# Patient Record
Sex: MALE | Race: WHITE | Employment: FULL TIME | URBAN - NONMETROPOLITAN AREA
[De-identification: names, ages, dates, MRNs, and addresses within clinical notes are randomized per-mention and may not be internally consistent; named-entity substitution may affect disease eponyms.]

---

## 2019-04-01 ENCOUNTER — OFFICE VISIT (OUTPATIENT)
Dept: FAMILY MEDICINE CLINIC | Age: 34
End: 2019-04-01
Payer: COMMERCIAL

## 2019-04-01 VITALS
SYSTOLIC BLOOD PRESSURE: 138 MMHG | RESPIRATION RATE: 16 BRPM | DIASTOLIC BLOOD PRESSURE: 88 MMHG | HEART RATE: 95 BPM | HEIGHT: 72 IN | BODY MASS INDEX: 31.69 KG/M2 | WEIGHT: 234 LBS | OXYGEN SATURATION: 98 %

## 2019-04-01 DIAGNOSIS — I10 ESSENTIAL HYPERTENSION: Primary | ICD-10-CM

## 2019-04-01 DIAGNOSIS — N50.89 SCROTAL SWELLING: ICD-10-CM

## 2019-04-01 PROCEDURE — 99204 OFFICE O/P NEW MOD 45 MIN: CPT | Performed by: FAMILY MEDICINE

## 2019-04-01 RX ORDER — LISINOPRIL 20 MG/1
20 TABLET ORAL DAILY
Qty: 90 TABLET | Refills: 1 | Status: SHIPPED | OUTPATIENT
Start: 2019-04-01

## 2019-04-01 ASSESSMENT — ENCOUNTER SYMPTOMS
COUGH: 0
RHINORRHEA: 0
NAUSEA: 0
ABDOMINAL PAIN: 0
SORE THROAT: 0
WHEEZING: 0
EYE DISCHARGE: 0
DIARRHEA: 0
VOMITING: 0
CONSTIPATION: 1
SHORTNESS OF BREATH: 0

## 2019-04-01 ASSESSMENT — PATIENT HEALTH QUESTIONNAIRE - PHQ9
SUM OF ALL RESPONSES TO PHQ QUESTIONS 1-9: 0
SUM OF ALL RESPONSES TO PHQ9 QUESTIONS 1 & 2: 0
1. LITTLE INTEREST OR PLEASURE IN DOING THINGS: 0
SUM OF ALL RESPONSES TO PHQ QUESTIONS 1-9: 0
2. FEELING DOWN, DEPRESSED OR HOPELESS: 0

## 2019-04-01 NOTE — PROGRESS NOTES
Ilda Feliciano  [address] [place] [address]  Dept: [phone]  Dept Fax: [phone]  Loc: [phone]    Marilynn Guardado is a 28 y.o. male who presents today for his medical conditions/complaints as noted below. Chief Complaint   Patient presents with    New Patient    Groin Pain     x 6 months    Other     Denied Tdap today-stated his last one was in 2007           HPI:     Patient presents to [facility] care and with complaints of right-sided scrotal swelling. He states that he had similar issue on the left side several years ago and had a hernia repair done at [address] [place] [address]. He states that the area seems to be swelling a little more it has increased in pain over the last few weeks. He feels a pressure in his groin and down into his scrotum. States that he often feels pressure when he goes to urinate as well. States this has been present for 6 months and worsening especially if he strains or lifts something. He does state that it's a little better when he lays back. Next, patient states that he's had elevated blood pressure in the past but he takes no medications regularly. States that he is willing to take blood pressure medication if needed, as his blood pressure is elevated today. He denies chest pain or shortness of breath, denies headache or visual changes. Otherwise he's feeling well and does not have any other concerns. Groin Pain   The patient's primary symptoms include scrotal swelling and testicular pain. This is a new problem. The current episode started more than 1 month ago (6 months ago). The problem occurs constantly. The problem has been gradually worsening. The pain is medium. Associated symptoms include constipation.  Pertinent negatives include no abdominal pain, anorexia, chest pain, chills, coughing, diarrhea, discolored urine, dysuria, fever, flank pain, frequency, headaches, hematuria, hesitancy, joint pain, joint swelling, nausea, painful intercourse, rash, shortness of breath, sore throat, urgency, urinary retention or vomiting. The symptoms are aggravated by tactile pressure. He has tried nothing for the symptoms. The treatment provided no relief. He is sexually active. No, his partner does not have an STD. His past medical history is significant for an inguinal hernia. There is no history of BPH, chlamydia, erectile aid use, erectile dysfunction, a femoral hernia, gonorrhea, herpes simplex, HIV, kidney stones, prostatitis, sickle cell disease or syphilis. Past Medical History:   Diagnosis Date    GERD (gastroesophageal reflux disease)       Past Surgical History:   Procedure Laterality Date    HERNIA REPAIR         No family history on file. Social History     Tobacco Use    Smoking status: Never Smoker    Smokeless tobacco: Never Used   Substance Use Topics    Alcohol use: Yes     Comment: occ      Current Outpatient Medications   Medication Sig Dispense Refill    lisinopril (PRINIVIL;ZESTRIL) 20 MG tablet Take 1 tablet by mouth daily 90 tablet 1     No current facility-administered medications for this visit. No Known Allergies    Health Maintenance   Topic Date Due    Varicella Vaccine (1 of 2 - 13+ 2-dose series) 09/14/1997    HIV screen  09/14/1999    DTaP/Tdap/Td vaccine (1 - Tdap) 09/14/2003    Potassium monitoring  11/25/2017    Creatinine monitoring  11/25/2017    Flu vaccine (Season Ended) 09/01/2019       Subjective:      Review of Systems   Constitutional: Positive for activity change and appetite change. Negative for chills, fatigue and fever. HENT: Negative for congestion, rhinorrhea and sore throat. Eyes: Negative for discharge and visual disturbance. Respiratory: Negative for cough, shortness of breath and wheezing. Cardiovascular: Negative for chest pain and palpitations. Gastrointestinal: Positive for constipation.  Negative for abdominal pain, anorexia, diarrhea, nausea and vomiting. Genitourinary: Positive for scrotal swelling and testicular pain. Negative for dysuria, flank pain, frequency, hematuria, hesitancy and urgency. Musculoskeletal: Negative for arthralgias, joint pain and myalgias. Skin: Negative for rash. Neurological: Negative for dizziness and headaches. Psychiatric/Behavioral: Negative for sleep disturbance. The patient is not nervous/anxious. Objective:     Physical Exam   Constitutional: He is oriented to person, place, and time. He appears well-developed and well-nourished. No distress. HENT:   Head: Normocephalic and atraumatic. Right Ear: Hearing, tympanic membrane, external ear and ear canal normal.   Left Ear: Hearing, tympanic membrane, external ear and ear canal normal.   Nose: Right sinus exhibits no maxillary sinus tenderness and no frontal sinus tenderness. Left sinus exhibits no maxillary sinus tenderness and no frontal sinus tenderness. Mouth/Throat: Oropharynx is clear and moist and mucous membranes are normal. No oropharyngeal exudate, posterior oropharyngeal edema or posterior oropharyngeal erythema. Eyes: Pupils are equal, round, and reactive to light. Conjunctivae and EOM are normal. Right eye exhibits no discharge. Left eye exhibits no discharge. No scleral icterus. Neck: Normal range of motion. Neck supple. Cardiovascular: Normal rate, regular rhythm, normal heart sounds and intact distal pulses. Pulmonary/Chest: Effort normal and breath sounds normal. No respiratory distress. He has no wheezes. Abdominal: Soft. Bowel sounds are normal. He exhibits no distension. There is no tenderness. A hernia is present. Hernia confirmed positive in the right inguinal area. Hernia confirmed negative in the left inguinal area. Genitourinary: Penis normal. Right testis shows swelling and tenderness. Left testis shows no mass, no swelling and no tenderness.    Musculoskeletal: Normal range of motion. He exhibits no edema or tenderness. Lymphadenopathy:     He has no cervical adenopathy. Neurological: He is alert and oriented to person, place, and time. He exhibits normal muscle tone. Coordination normal.   Skin: Skin is warm and dry. No rash noted. Psychiatric: He has a normal mood and affect. His behavior is normal. Judgment and thought content normal.   Nursing note and vitals reviewed. /88 (Site: Left Upper Arm, Position: Sitting, Cuff Size: Medium Adult)   Pulse 95   Resp 16   Ht 6' (1.829 m)   Wt 234 lb (106.1 kg)   SpO2 98%   BMI 31.74 kg/m²     Assessment:       Diagnosis Orders   1. Essential hypertension  lisinopril (PRINIVIL;ZESTRIL) 20 MG tablet   2. Scrotal swelling  US SCROTUM AND TESTICLES       Plan:   Lisinopril for HTN- follow up for recheck in 2 wks  Will get ultrasound of scrotum and refer accordingly after results. Discussed use, benefit, and side effects of prescribed medications. Barriers tomedication compliance addressed. All patient questions answered. Pt voiced understanding. Return in about 2 weeks (around 4/15/2019) for HTN. Orders Placed This Encounter   Procedures    US SCROTUM AND TESTICLES     Standing Status:   Future     Standing Expiration Date:   4/1/2020     Orders Placed This Encounter   Medications    lisinopril (PRINIVIL;ZESTRIL) 20 MG tablet     Sig: Take 1 tablet by mouth daily     Dispense:  90 tablet     Refill:  1        Reccommended tobacco cessation options including pharmacologicmethods, counseled great than 3 minutes during this visit:  Yes  []  No  []     Patient given educational materials - see patient instructions. Discussed use, benefit, and sideeffects of prescribed medications. All patient questions answered. Pt voiced understanding. Reviewed health maintenance. Instructed to continue current medications, diet and exercise. Patient agreed with treatment plan. Follow up as directed.      Electronically signed by Rose Mary Tse MD on 4/1/2019 at 2:05 PM

## 2019-04-01 NOTE — PATIENT INSTRUCTIONS

## 2019-04-03 ENCOUNTER — HOSPITAL ENCOUNTER (OUTPATIENT)
Dept: ULTRASOUND IMAGING | Age: 34
Discharge: HOME | End: 2019-04-03
Payer: COMMERCIAL

## 2019-04-03 DIAGNOSIS — N50.89 SCROTAL SWELLING: ICD-10-CM

## 2019-04-03 DIAGNOSIS — K40.90 RIGHT INGUINAL HERNIA: Primary | ICD-10-CM

## 2019-04-03 PROCEDURE — 76870 US EXAM SCROTUM: CPT

## 2019-04-06 PROBLEM — K40.90 RIGHT INGUINAL HERNIA: Status: ACTIVE | Noted: 2019-04-06

## 2019-04-06 NOTE — PROGRESS NOTES
Rhett Montiel MD [facility]  General Surgery  New Patient Evaluation in Office  Pt Name: Marcella May  Date of Birth 1985   Today's Date: 4/8/2019  Medical Record Number: [id number]  Referring Provider: Jimy Abdul MD  Primary Care Provider: Caroline Walker MD  Chief Complaint   Patient presents with   [facility] Surgical Consult     new patient--ref by Dr. Yesica Haines for [place]      Problem List Items Addressed This Visit     Right inguinal hernia    Relevant Orders    LAPAROSCOPY REPAIR HERNIA INGUINAL        Past Medical History:   Diagnosis Date    GERD (gastroesophageal reflux disease)     Hypertension           PLANS      1. Schedule Jose Luis for RIGHT Robotic assisted inguinal hernia possible left possible open  repair with mesh  2. In the office, I had a discussion with the patient regarding the risks of hernia surgery to include bleeding, infection, recurrence, injury to surrounding structures and continued pain in the area. We also discussed the use of mesh and its advantages and disadvantages. We discussed the anesthetic options, conduct of the operation, outpatient status, post op recovery and length of time off of work. After this discussion, the patient's questions were answered and has elected to proceed with surgical repair. (OPEN)  3. In the office, I had a discussion with the patient regarding the risks of hernia surgery to include bleeding, infection, recurrence, injury to surrounding structures, continued pain in the area and possible conversion to an open procedure . We also discussed the use of mesh and its advantages and disadvantages. We discussed the anesthetic options, conduct of the operation, outpatient status, post op recovery and length of time off of work. After this discussion, the patient's questions were answered and has elected to proceed with surgical repair. (L/S)  4. Status: outpatient  5. Planned anesthesia: General  6.  He will undergo pre-operative clearance per anesthesia guidelines with risk factors listed under the past medical history diagnosis & problem list.  7. Perioperative discontinuation of ASA, Plavix, warfarin, Brillinta, Effient, Pradaxa, Eliquis and Xarelto. Continuation of 81 mg Aspirin is acceptable. 8. Perioperative medical clearance is not required   Orders Placed This Encounter:  Orders Placed This Encounter   Procedures    LAPAROSCOPY REPAIR HERNIA INGUINAL     Standing Status:   Future     Standing Expiration Date:   4/8/2020     Order Specific Question:   Pre-procedure Diagnosis     Answer:   Mary Soliz is a 28 y.o. male seen in the office for evaluation of a right inguinal hernia. Symptoms were first noted 6 months ago and has gradually worsened since that time. The pain is dull, radiating to groin, moderate in severity. It is aggravated by bending, coughing, lifting, standing and palliated by rest. He has no additional symptoms. He denies signs or symptoms of incarceration or strangulation. He has had prior left inguinal hernia surgery. This was done at least 10 years ago by Dr. Singh Shepherd. This hernia is not stated to be work related per patient. Past Medical History  Past Medical History:   Diagnosis Date    GERD (gastroesophageal reflux disease)     Hypertension        Past Surgical History  Past Surgical History:   Procedure Laterality Date    HERNIA REPAIR Left 2009    inguinal-[facility] Dr Singh Shepherd       Medications  Current Outpatient Medications on File Prior to Visit   Medication Sig Dispense Refill    lisinopril (PRINIVIL;ZESTRIL) 20 MG tablet Take 1 tablet by mouth daily 90 tablet 1     No current facility-administered medications on file prior to visit.       Allergies  No Known Allergies    Family History  Family History   Problem Relation Age of Onset    No Known Problems Mother     No Known Problems Father     No Known Problems Sister     No Known Problems Maternal Grandmother     Other Maternal Grandfather         CAR ACCIDENT    No Known Problems Paternal Grandmother     Cancer Paternal Grandfather         UNSURE OF TYPE    No Known Problems Sister        Social History  Social History     Socioeconomic History    Marital status:      Spouse name: Not on file    Number of children: Not on file    Years of education: Not on file    Highest education level: Not on file   Occupational History    Not on file   Social Needs    Financial resource strain: Not on file    Food insecurity:     Worry: Not on file     Inability: Not on file    Transportation needs:     Medical: Not on file     Non-medical: Not on file   Tobacco Use    Smoking status: Never Smoker    Smokeless tobacco: Never Used   Substance and Sexual Activity    Alcohol use: Yes     Comment: occ    Drug use: No    Sexual activity: Not on file   Lifestyle    Physical activity:     Days per week: Not on file     Minutes per session: Not on file    Stress: Not on file   Relationships    Social connections:     Talks on phone: Not on file     Gets together: Not on file     Attends [employer] service: Not on file     Active member of club or organization: Not on file     Attends meetings of clubs or organizations: Not on file     Relationship status: Not on file    Intimate partner violence:     Fear of current or ex partner: Not on file     Emotionally abused: Not on file     Physically abused: Not on file     Forced sexual activity: Not on file   Other Topics Concern    Not on file   Social History Narrative    Not on file       [address] Maintenance   Topic Date Due    Varicella Vaccine (1 of 2 - 13+ 2-dose series) 09/14/1997    HIV screen  09/14/1999    DTaP/Tdap/Td vaccine (1 - Tdap) 09/14/2003    Potassium monitoring  11/25/2017    Creatinine monitoring  11/25/2017    Flu vaccine (Season Ended) 09/01/2019       Review of Systems  Constitutional: Negative for activity change, appetite is 97.2 °F (36.2 °C). His blood pressure is 132/80 and his pulse is 95. His respiration is 18 and oxygen saturation is 98%. CONSTITUTIONAL: Alert and oriented times 3, no acute distress and cooperative to examination with proper mood and affect. SKIN: Skin color, texture, turgor normal. No rashes or lesions. LYMPH: no cervical nodes, no inguinal nodes  HEENT: Head is normocephalic, atraumatic. EOMI, PERRLA. NECK: Supple, symmetrical, trachea midline, no adenopathy, thyroid symmetric, not enlarged and no tenderness, skin normal.  CHEST/LUNGS: chest symmetric with normal A/P diameter, normal respiratory rate and rhythm, lungs clear to auscultation without wheezes, rales or rhonchi. No accessory muscle use. Scars None   CARDIOVASCULAR: Heart sounds are normal.  Regular rate and rhythm without murmur, gallop or rub. Normal S1 and S2. . Carotid and femoral pulses 2+/4 and equal bilaterally. ABDOMEN: Normal shape. umbilical and laparoscopic scar(s) present. Normal bowel sounds. No bruits. Francois Challenger \"soft, nontender, nondistended, no masses or organomegaly. Right inguinal hernia, with bowel involvement present which is reducible. Percussion: Normal without hepatosplenomegally. Tenderness: absent. RECTAL: deferred, not clinically indicated  NEUROLOGIC: There are no focalizing motor or sensory deficits. CN II-XII are grossly intact. Francois Challenger EXTREMITIES: no cyanosis, no clubbing and no edema.       .         Electronically signed by Melina Chavez MD on 4/8/2019 at 4:57 PM

## 2019-04-08 ENCOUNTER — TELEPHONE (OUTPATIENT)
Dept: SURGERY | Age: 34
End: 2019-04-08

## 2019-04-08 ENCOUNTER — OFFICE VISIT (OUTPATIENT)
Dept: SURGERY | Age: 34
End: 2019-04-08
Payer: COMMERCIAL

## 2019-04-08 VITALS
HEART RATE: 95 BPM | HEIGHT: 72 IN | RESPIRATION RATE: 18 BRPM | TEMPERATURE: 97.2 F | WEIGHT: 239.2 LBS | BODY MASS INDEX: 32.4 KG/M2 | DIASTOLIC BLOOD PRESSURE: 80 MMHG | OXYGEN SATURATION: 98 % | SYSTOLIC BLOOD PRESSURE: 132 MMHG

## 2019-04-08 DIAGNOSIS — I10 ESSENTIAL HYPERTENSION: ICD-10-CM

## 2019-04-08 DIAGNOSIS — K40.90 RIGHT INGUINAL HERNIA: Primary | ICD-10-CM

## 2019-04-08 DIAGNOSIS — Z01.818 PRE-OP TESTING: ICD-10-CM

## 2019-04-08 DIAGNOSIS — K40.90 RIGHT INGUINAL HERNIA: ICD-10-CM

## 2019-04-08 PROCEDURE — 99203 OFFICE O/P NEW LOW 30 MIN: CPT | Performed by: SURGERY

## 2019-04-08 ASSESSMENT — ENCOUNTER SYMPTOMS
DIARRHEA: 0
RHINORRHEA: 0
ABDOMINAL PAIN: 0
RECTAL PAIN: 0
STRIDOR: 0
VOMITING: 0
SORE THROAT: 0
FACIAL SWELLING: 0
EYE PAIN: 0
SINUS PRESSURE: 0
TROUBLE SWALLOWING: 0
CHOKING: 0
CONSTIPATION: 0
SHORTNESS OF BREATH: 0
EYE REDNESS: 0
COLOR CHANGE: 0
VOICE CHANGE: 0
NAUSEA: 0
WHEEZING: 0
ABDOMINAL DISTENTION: 0
BLOOD IN STOOL: 0
BACK PAIN: 0
PHOTOPHOBIA: 0
COUGH: 0
APNEA: 0
ANAL BLEEDING: 0
CHEST TIGHTNESS: 0
EYE ITCHING: 0
EYE DISCHARGE: 0

## 2019-04-08 NOTE — TELEPHONE ENCOUNTER
[facility], possible left, possible mesh & possible open on Mon 5/6 at 7:30 & he will arrive at [facility]. He was told to be npo after midnight & consent was signed. Orders were given for a bmp & ekg. Antibacterial soap was also given along with the instructions.

## 2019-04-08 NOTE — LETTER
[facility]  Giovanni Barrow MD FACS  Phone- [phone]  Fax [phone]    Pt Name: Emily Coker  Medical Record Number: [id number]  Date of Birth 1985   Today's Date: 4/8/2019    [facility] El[place],    Carlos Graham was evaluated in the office today. My assessment and plans are listed below. Assessment:     Carlos Graham was seen today for surgical consult. Diagnoses and all orders for this visit:    Right inguinal hernia  -     LAPAROSCOPY REPAIR HERNIA INGUINAL; Future         Plan:     1. Schedule Jose Luis for RIGHT Robotic assisted inguinal hernia possible left possible open  repair with mesh  2. In the office, I had a discussion with the patient regarding the risks of hernia surgery to include bleeding, infection, recurrence, injury to surrounding structures and continued pain in the area. We also discussed the use of mesh and its advantages and disadvantages. We discussed the anesthetic options, conduct of the operation, outpatient status, post op recovery and length of time off of work. After this discussion, the patient's questions were answered and has elected to proceed with surgical repair. (OPEN)  3. In the office, I had a discussion with the patient regarding the risks of hernia surgery to include bleeding, infection, recurrence, injury to surrounding structures, continued pain in the area and possible conversion to an open procedure . We also discussed the use of mesh and its advantages and disadvantages. We discussed the anesthetic options, conduct of the operation, outpatient status, post op recovery and length of time off of work. After this discussion, the patient's questions were answered and has elected to proceed with surgical repair. (L/S)  4. Status: outpatient  5. Planned anesthesia: GENeral  6.  He will undergo pre-operative clearance per anesthesia guidelines with risk factors listed under the past medical history diagnosis & problem list.

## 2019-04-08 NOTE — PROGRESS NOTES
Subjective:      Patient ID: Ursula Hernández is a 28 y.o. male. Chief Complaint   Patient presents with    Surgical Consult     new patient--ref by Dr. Yakov Jurado for [facility]       HPI    Review of Systems   Constitutional: Negative for activity change, appetite change, chills, diaphoresis, fatigue, fever and unexpected weight change. HENT: Negative for congestion, dental problem, drooling, ear discharge, ear pain, facial swelling, hearing loss, mouth sores, nosebleeds, postnasal drip, rhinorrhea, sinus pressure, sneezing, sore throat, tinnitus, trouble swallowing and voice change. Eyes: Negative for photophobia, pain, discharge, redness, itching and visual disturbance. Respiratory: Negative for apnea, cough, choking, chest tightness, shortness of breath, wheezing and stridor. Cardiovascular: Negative for chest pain, palpitations and leg swelling. Gastrointestinal: Negative for abdominal distention, abdominal pain, anal bleeding, blood in stool, constipation, diarrhea, nausea, rectal pain and vomiting. Bulge right groin,painful   Endocrine: Negative for cold intolerance, heat intolerance, polydipsia, polyphagia and polyuria. Genitourinary: Positive for difficulty urinating. Negative for decreased urine volume, dysuria, enuresis, flank pain, frequency, genital sores, hematuria and urgency. Musculoskeletal: Negative for arthralgias, back pain, gait problem, joint swelling, myalgias, neck pain and neck stiffness. Skin: Negative for color change, pallor, rash and wound. Allergic/Immunologic: Negative for environmental allergies, food allergies and immunocompromised state. Neurological: Negative for dizziness, tremors, seizures, syncope, facial asymmetry, speech difficulty, weakness, light-headedness, numbness and headaches. Hematological: Negative for adenopathy. Does not bruise/bleed easily.    Psychiatric/Behavioral: Negative for agitation, behavioral problems, confusion, decreased concentration, dysphoric mood, hallucinations, self-injury, sleep disturbance and suicidal ideas. The patient is not nervous/anxious and is not hyperactive.       /80 (Site: Right Upper Arm, Position: Sitting, Cuff Size: Medium Adult)   Pulse 95   Temp 97.2 °F (36.2 °C) (Tympanic)   Resp 18   Ht 6' (1.829 m)   Wt 239 lb 3.2 oz (108.5 kg)   SpO2 98%   BMI 32.44 kg/m²     Objective:   Physical Exam    Assessment:            Plan:              Katie Whitehead LPN

## 2019-04-25 ENCOUNTER — TELEPHONE (OUTPATIENT)
Dept: SURGERY | Age: 34
End: 2019-04-25

## 2019-05-01 ENCOUNTER — HOSPITAL ENCOUNTER (OUTPATIENT)
Age: 34
Discharge: HOME | End: 2019-05-01
Payer: COMMERCIAL

## 2019-05-01 DIAGNOSIS — K40.90 RIGHT INGUINAL HERNIA: ICD-10-CM

## 2019-05-01 DIAGNOSIS — Z01.818 PRE-OP TESTING: ICD-10-CM

## 2019-05-01 DIAGNOSIS — I10 ESSENTIAL HYPERTENSION: ICD-10-CM

## 2019-05-01 LAB
EKG ATRIAL RATE: 82 BPM
EKG P AXIS: 30 DEGREES
EKG P-R INTERVAL: 150 MS
EKG Q-T INTERVAL: 404 MS
EKG QRS DURATION: 106 MS
EKG QTC CALCULATION (BAZETT): 472 MS
EKG R AXIS: 65 DEGREES
EKG T AXIS: 54 DEGREES
EKG VENTRICULAR RATE: 82 BPM

## 2019-05-01 PROCEDURE — 93005 ELECTROCARDIOGRAM TRACING: CPT | Performed by: SURGERY

## 2019-05-01 PROCEDURE — 36415 COLL VENOUS BLD VENIPUNCTURE: CPT

## 2019-05-01 PROCEDURE — 80048 BASIC METABOLIC PNL TOTAL CA: CPT

## 2019-05-02 LAB
ANION GAP SERPL CALCULATED.3IONS-SCNC: 11 MEQ/L (ref 8–16)
BUN BLDV-MCNC: 13 MG/DL (ref 7–22)
CALCIUM SERPL-MCNC: 9.1 MG/DL (ref 8.5–10.5)
CHLORIDE BLD-SCNC: 101 MEQ/L (ref 98–111)
CO2: 31 MEQ/L (ref 23–33)
CREAT SERPL-MCNC: 0.9 MG/DL (ref 0.4–1.2)
GFR SERPL CREATININE-BSD FRML MDRD: > 90 ML/MIN/1.73M2
GLUCOSE BLD-MCNC: 79 MG/DL (ref 70–108)
POTASSIUM SERPL-SCNC: 3.8 MEQ/L (ref 3.5–5.2)
SODIUM BLD-SCNC: 143 MEQ/L (ref 135–145)

## 2019-05-02 PROCEDURE — 93010 ELECTROCARDIOGRAM REPORT: CPT | Performed by: NUCLEAR MEDICINE

## 2019-05-04 NOTE — H&P
Samuel Robles MD [facility]  General Surgery  H and P for Surgery   Pt Name: Cedrick Ang  Date of Birth 1985   Today's Date: 4/8/2019  Medical Record Number: [id number]  Referring Provider: Garrett Rincon MD  Primary Care Provider: Tiana Godfrey MD       Chief Complaint   Patient presents with   [employer] [facility]       new patient--ref by Dr. Vicenta Evans for [facility]      ASSESSMENT        Problem List Items Addressed This Visit      Right inguinal hernia      Relevant Orders      LAPAROSCOPY REPAIR HERNIA INGUINAL                Past Medical History:   Diagnosis Date    GERD (gastroesophageal reflux disease)      Hypertension         PLANS   1. Schedule Jose Luis for RIGHT Robotic assisted inguinal hernia possible left possible open  repair with mesh  2. In the office, I had a discussion with the patient regarding the risks of hernia surgery to include bleeding, infection, recurrence, injury to surrounding structures and continued pain in the area. We also discussed the use of mesh and its advantages and disadvantages. We discussed the anesthetic options, conduct of the operation, outpatient status, post op recovery and length of time off of work. After this discussion, the patient's questions were answered and has elected to proceed with surgical repair. (OPEN)  3. In the office, I had a discussion with the patient regarding the risks of hernia surgery to include bleeding, infection, recurrence, injury to surrounding structures, continued pain in the area and possible conversion to an open procedure . We also discussed the use of mesh and its advantages and disadvantages. We discussed the anesthetic options, conduct of the operation, outpatient status, post op recovery and length of time off of work. After this discussion, the patient's questions were answered and has elected to proceed with surgical repair. (L/S)  4. Status: outpatient  5. Planned anesthesia: General  6.  He will undergo pre-operative clearance per Problems Maternal Grandmother      Other Maternal Grandfather           CAR ACCIDENT    No Known Problems Paternal Grandmother      Cancer Paternal Grandfather           UNSURE OF TYPE    No Known Problems Sister         Social History  Social History            Socioeconomic History    Marital status:        Spouse name: Not on file    Number of children: Not on file    Years of education: Not on file    Highest education level: Not on file   Occupational History    Not on file   Social Needs    Financial resource strain: Not on file    Food insecurity:       Worry: Not on file       Inability: Not on file    Transportation needs:       Medical: Not on file       Non-medical: Not on file   Tobacco Use    Smoking status: Never Smoker    Smokeless tobacco: Never Used   Substance and Sexual Activity    Alcohol use:  Yes       Comment: occ    Drug use: No    Sexual activity: Not on file   Lifestyle    Physical activity:       Days per week: Not on file       Minutes per session: Not on file    Stress: Not on file   Relationships    Social connections:       Talks on phone: Not on file       Gets together: Not on file       Attends [employer] service: Not on file       Active member of club or organization: Not on file       Attends meetings of clubs or organizations: Not on file       Relationship status: Not on file    Intimate partner violence:       Fear of current or ex partner: Not on file       Emotionally abused: Not on file       Physically abused: Not on file       Forced sexual activity: Not on file   Other Topics Concern    Not on file   Social History Narrative    Not on [address] Maintenance   Topic Date Due    Varicella Vaccine (1 of 2 - 13+ 2-dose series) 09/14/1997    HIV screen  09/14/1999    DTaP/Tdap/Td vaccine (1 - Tdap) 09/14/2003    Potassium monitoring  11/25/2017    Creatinine monitoring  11/25/2017    Flu vaccine (Season Ended) 09/01/2019       Review of Systems  Constitutional: Negative for activity change, appetite change, chills, diaphoresis, fatigue, fever and unexpected weight change. HENT: Negative for congestion, dental problem, drooling, ear discharge, ear pain, facial swelling, hearing loss, mouth sores, nosebleeds, postnasal drip, rhinorrhea, sinus pressure, sneezing, sore throat, tinnitus, trouble swallowing and voice change.    Eyes: Negative for photophobia, pain, discharge, redness, itching and visual disturbance. Respiratory: Negative for apnea, cough, choking, chest tightness, shortness of breath, wheezing and stridor.    Cardiovascular: Negative for chest pain, palpitations and leg swelling. Gastrointestinal: Negative for abdominal distention, abdominal pain, anal bleeding, blood in stool, constipation, diarrhea, nausea, rectal pain and vomiting.        Bulge right groin,painful   Endocrine: Negative for cold intolerance, heat intolerance, polydipsia, polyphagia and polyuria. Genitourinary: Positive for difficulty urinating. Negative for decreased urine volume, dysuria, enuresis, flank pain, frequency, genital sores, hematuria and urgency. Musculoskeletal: Negative for arthralgias, back pain, gait problem, joint swelling, myalgias, neck pain and neck stiffness. Skin: Negative for color change, pallor, rash and wound. Allergic/Immunologic: Negative for environmental allergies, food allergies and immunocompromised state. Neurological: Negative for dizziness, tremors, seizures, syncope, facial asymmetry, speech difficulty, weakness, light-headedness, numbness and headaches. Hematological: Negative for adenopathy. Does not bruise/bleed easily. Psychiatric/Behavioral: Negative for agitation, behavioral problems, confusion, decreased concentration, dysphoric mood, hallucinations, self-injury, sleep disturbance and suicidal ideas.  The patient is not nervous/anxious and is not hyperactive    OBJECTIVE VITALS:  height is 6' (1.829 m) and weight is 239 lb 3.2 oz (108.5 kg). His tympanic temperature is 97.2 °F (36.2 °C). His blood pressure is 132/80 and his pulse is 95. His respiration is 18 and oxygen saturation is 98%. CONSTITUTIONAL: Alert and oriented times 3, no acute distress and cooperative to examination with proper mood and affect. SKIN: Skin color, texture, turgor normal. No rashes or lesions. LYMPH: no cervical nodes, no inguinal nodes  HEENT: Head is normocephalic, atraumatic. EOMI, PERRLA. NECK: Supple, symmetrical, trachea midline, no adenopathy, thyroid symmetric, not enlarged and no tenderness, skin normal.  CHEST/LUNGS: chest symmetric with normal A/P diameter, normal respiratory rate and rhythm, lungs clear to auscultation without wheezes, rales or rhonchi. No accessory muscle use. Scars None   CARDIOVASCULAR: Heart sounds are normal.  Regular rate and rhythm without murmur, gallop or rub. Normal S1 and S2. . Carotid and femoral pulses 2+/4 and equal bilaterally. ABDOMEN: Normal shape. umbilical and laparoscopic scar(s) present. Normal bowel sounds. No bruits. Marcus Motto \"soft, nontender, nondistended, no masses or organomegaly. Right inguinal hernia, with bowel involvement present which is reducible. Percussion: Normal without hepatosplenomegally. Tenderness: absent. RECTAL: deferred, not clinically indicated  NEUROLOGIC: There are no focalizing motor or sensory deficits. CN II-XII are grossly intact. Marcus Motto     EXTREMITIES: no cyanosis, no clubbing and no edema.       .           Electronically signed by Talha Kirkland MD on 4/8/2019 at 4:57

## 2019-05-06 ENCOUNTER — ANESTHESIA (OUTPATIENT)
Dept: OPERATING ROOM | Age: 34
End: 2019-05-06

## 2019-05-06 ENCOUNTER — HOSPITAL ENCOUNTER (OUTPATIENT)
Age: 34
Setting detail: OUTPATIENT SURGERY
Discharge: HOME | End: 2019-05-06
Attending: SURGERY | Admitting: SURGERY
Payer: COMMERCIAL

## 2019-05-06 ENCOUNTER — ANESTHESIA EVENT (OUTPATIENT)
Dept: OPERATING ROOM | Age: 34
End: 2019-05-06

## 2019-05-06 VITALS
OXYGEN SATURATION: 100 % | DIASTOLIC BLOOD PRESSURE: 61 MMHG | TEMPERATURE: 97.7 F | RESPIRATION RATE: 19 BRPM | SYSTOLIC BLOOD PRESSURE: 112 MMHG

## 2019-05-06 VITALS
OXYGEN SATURATION: 96 % | DIASTOLIC BLOOD PRESSURE: 74 MMHG | RESPIRATION RATE: 18 BRPM | HEART RATE: 79 BPM | TEMPERATURE: 97.6 F | SYSTOLIC BLOOD PRESSURE: 124 MMHG

## 2019-05-06 DIAGNOSIS — K40.30 INCARCERATED RIGHT INGUINAL HERNIA: Primary | ICD-10-CM

## 2019-05-06 PROCEDURE — 3700000001 HC ADD 15 MINUTES (ANESTHESIA): Performed by: SURGERY

## 2019-05-06 PROCEDURE — 2580000003 HC RX 258: Performed by: SURGERY

## 2019-05-06 PROCEDURE — 6360000002 HC RX W HCPCS

## 2019-05-06 PROCEDURE — 3600000009 HC SURGERY ROBOT BASE: Performed by: SURGERY

## 2019-05-06 PROCEDURE — 6360000002 HC RX W HCPCS: Performed by: SURGERY

## 2019-05-06 PROCEDURE — C1781 MESH (IMPLANTABLE): HCPCS | Performed by: SURGERY

## 2019-05-06 PROCEDURE — 6360000002 HC RX W HCPCS: Performed by: ANESTHESIOLOGY

## 2019-05-06 PROCEDURE — 49650 LAP ING HERNIA REPAIR INIT: CPT | Performed by: SURGERY

## 2019-05-06 PROCEDURE — 6360000002 HC RX W HCPCS: Performed by: NURSE ANESTHETIST, CERTIFIED REGISTERED

## 2019-05-06 PROCEDURE — 3700000000 HC ANESTHESIA ATTENDED CARE: Performed by: SURGERY

## 2019-05-06 PROCEDURE — 7100000011 HC PHASE II RECOVERY - ADDTL 15 MIN: Performed by: SURGERY

## 2019-05-06 PROCEDURE — 6370000000 HC RX 637 (ALT 250 FOR IP): Performed by: SURGERY

## 2019-05-06 PROCEDURE — 7100000001 HC PACU RECOVERY - ADDTL 15 MIN: Performed by: SURGERY

## 2019-05-06 PROCEDURE — S2900 ROBOTIC SURGICAL SYSTEM: HCPCS | Performed by: SURGERY

## 2019-05-06 PROCEDURE — 2500000003 HC RX 250 WO HCPCS: Performed by: NURSE ANESTHETIST, CERTIFIED REGISTERED

## 2019-05-06 PROCEDURE — 7100000000 HC PACU RECOVERY - FIRST 15 MIN: Performed by: SURGERY

## 2019-05-06 PROCEDURE — 2709999900 HC NON-CHARGEABLE SUPPLY: Performed by: SURGERY

## 2019-05-06 PROCEDURE — 7100000010 HC PHASE II RECOVERY - FIRST 15 MIN: Performed by: SURGERY

## 2019-05-06 PROCEDURE — 2500000003 HC RX 250 WO HCPCS: Performed by: SURGERY

## 2019-05-06 PROCEDURE — 3600000019 HC SURGERY ROBOT ADDTL 15MIN: Performed by: SURGERY

## 2019-05-06 RX ORDER — MIDAZOLAM HYDROCHLORIDE 1 MG/ML
INJECTION INTRAMUSCULAR; INTRAVENOUS PRN
Status: DISCONTINUED | OUTPATIENT
Start: 2019-05-06 | End: 2019-05-06 | Stop reason: SDUPTHER

## 2019-05-06 RX ORDER — KETOROLAC TROMETHAMINE 30 MG/ML
INJECTION, SOLUTION INTRAMUSCULAR; INTRAVENOUS PRN
Status: DISCONTINUED | OUTPATIENT
Start: 2019-05-06 | End: 2019-05-06 | Stop reason: SDUPTHER

## 2019-05-06 RX ORDER — LIDOCAINE HYDROCHLORIDE 20 MG/ML
INJECTION, SOLUTION INTRAVENOUS PRN
Status: DISCONTINUED | OUTPATIENT
Start: 2019-05-06 | End: 2019-05-06 | Stop reason: SDUPTHER

## 2019-05-06 RX ORDER — BUPIVACAINE HYDROCHLORIDE AND EPINEPHRINE 5; 5 MG/ML; UG/ML
INJECTION, SOLUTION EPIDURAL; INTRACAUDAL; PERINEURAL PRN
Status: DISCONTINUED | OUTPATIENT
Start: 2019-05-06 | End: 2019-05-06 | Stop reason: ALTCHOICE

## 2019-05-06 RX ORDER — NEOSTIGMINE METHYLSULFATE 5 MG/5 ML
SYRINGE (ML) INTRAVENOUS PRN
Status: DISCONTINUED | OUTPATIENT
Start: 2019-05-06 | End: 2019-05-06 | Stop reason: SDUPTHER

## 2019-05-06 RX ORDER — ONDANSETRON 2 MG/ML
4 INJECTION INTRAMUSCULAR; INTRAVENOUS EVERY 6 HOURS PRN
Status: DISCONTINUED | OUTPATIENT
Start: 2019-05-06 | End: 2019-05-06 | Stop reason: HOSPADM

## 2019-05-06 RX ORDER — ONDANSETRON 2 MG/ML
INJECTION INTRAMUSCULAR; INTRAVENOUS PRN
Status: DISCONTINUED | OUTPATIENT
Start: 2019-05-06 | End: 2019-05-06 | Stop reason: SDUPTHER

## 2019-05-06 RX ORDER — FENTANYL CITRATE 50 UG/ML
50 INJECTION, SOLUTION INTRAMUSCULAR; INTRAVENOUS EVERY 5 MIN PRN
Status: COMPLETED | OUTPATIENT
Start: 2019-05-06 | End: 2019-05-06

## 2019-05-06 RX ORDER — SODIUM CHLORIDE 9 MG/ML
INJECTION, SOLUTION INTRAVENOUS CONTINUOUS
Status: DISCONTINUED | OUTPATIENT
Start: 2019-05-06 | End: 2019-05-06 | Stop reason: HOSPADM

## 2019-05-06 RX ORDER — OXYCODONE HYDROCHLORIDE AND ACETAMINOPHEN 5; 325 MG/1; MG/1
1 TABLET ORAL EVERY 4 HOURS PRN
Status: DISCONTINUED | OUTPATIENT
Start: 2019-05-06 | End: 2019-05-06 | Stop reason: HOSPADM

## 2019-05-06 RX ORDER — MORPHINE SULFATE 2 MG/ML
2 INJECTION, SOLUTION INTRAMUSCULAR; INTRAVENOUS
Status: DISCONTINUED | OUTPATIENT
Start: 2019-05-06 | End: 2019-05-06 | Stop reason: HOSPADM

## 2019-05-06 RX ORDER — PROMETHAZINE HYDROCHLORIDE 25 MG/ML
INJECTION, SOLUTION INTRAMUSCULAR; INTRAVENOUS
Status: COMPLETED
Start: 2019-05-06 | End: 2019-05-06

## 2019-05-06 RX ORDER — GLYCOPYRROLATE 1 MG/5 ML
SYRINGE (ML) INTRAVENOUS PRN
Status: DISCONTINUED | OUTPATIENT
Start: 2019-05-06 | End: 2019-05-06 | Stop reason: SDUPTHER

## 2019-05-06 RX ORDER — FENTANYL CITRATE 50 UG/ML
INJECTION, SOLUTION INTRAMUSCULAR; INTRAVENOUS PRN
Status: DISCONTINUED | OUTPATIENT
Start: 2019-05-06 | End: 2019-05-06 | Stop reason: SDUPTHER

## 2019-05-06 RX ORDER — SODIUM CHLORIDE 0.9 % (FLUSH) 0.9 %
10 SYRINGE (ML) INJECTION EVERY 12 HOURS SCHEDULED
Status: DISCONTINUED | OUTPATIENT
Start: 2019-05-06 | End: 2019-05-06 | Stop reason: HOSPADM

## 2019-05-06 RX ORDER — MORPHINE SULFATE 2 MG/ML
4 INJECTION, SOLUTION INTRAMUSCULAR; INTRAVENOUS
Status: DISCONTINUED | OUTPATIENT
Start: 2019-05-06 | End: 2019-05-06 | Stop reason: HOSPADM

## 2019-05-06 RX ORDER — MELOXICAM 7.5 MG/1
7.5 TABLET ORAL ONCE
Status: COMPLETED | OUTPATIENT
Start: 2019-05-06 | End: 2019-05-06

## 2019-05-06 RX ORDER — FENTANYL CITRATE 50 UG/ML
INJECTION, SOLUTION INTRAMUSCULAR; INTRAVENOUS
Status: COMPLETED
Start: 2019-05-06 | End: 2019-05-06

## 2019-05-06 RX ORDER — ROCURONIUM BROMIDE 10 MG/ML
INJECTION, SOLUTION INTRAVENOUS PRN
Status: DISCONTINUED | OUTPATIENT
Start: 2019-05-06 | End: 2019-05-06 | Stop reason: SDUPTHER

## 2019-05-06 RX ORDER — OXYCODONE HYDROCHLORIDE AND ACETAMINOPHEN 5; 325 MG/1; MG/1
2 TABLET ORAL EVERY 4 HOURS PRN
Status: DISCONTINUED | OUTPATIENT
Start: 2019-05-06 | End: 2019-05-06 | Stop reason: HOSPADM

## 2019-05-06 RX ORDER — ACETAMINOPHEN 500 MG
1000 TABLET ORAL ONCE
Status: COMPLETED | OUTPATIENT
Start: 2019-05-06 | End: 2019-05-06

## 2019-05-06 RX ORDER — PROPOFOL 10 MG/ML
INJECTION, EMULSION INTRAVENOUS PRN
Status: DISCONTINUED | OUTPATIENT
Start: 2019-05-06 | End: 2019-05-06 | Stop reason: SDUPTHER

## 2019-05-06 RX ORDER — PROMETHAZINE HYDROCHLORIDE 25 MG/ML
12.5 INJECTION, SOLUTION INTRAMUSCULAR; INTRAVENOUS ONCE
Status: COMPLETED | OUTPATIENT
Start: 2019-05-06 | End: 2019-05-06

## 2019-05-06 RX ORDER — SUCCINYLCHOLINE/SOD CL,ISO/PF 200MG/10ML
SYRINGE (ML) INTRAVENOUS PRN
Status: DISCONTINUED | OUTPATIENT
Start: 2019-05-06 | End: 2019-05-06 | Stop reason: SDUPTHER

## 2019-05-06 RX ORDER — SODIUM CHLORIDE 0.9 % (FLUSH) 0.9 %
10 SYRINGE (ML) INJECTION PRN
Status: DISCONTINUED | OUTPATIENT
Start: 2019-05-06 | End: 2019-05-06 | Stop reason: HOSPADM

## 2019-05-06 RX ORDER — DEXAMETHASONE SODIUM PHOSPHATE 4 MG/ML
8 INJECTION, SOLUTION INTRA-ARTICULAR; INTRALESIONAL; INTRAMUSCULAR; INTRAVENOUS; SOFT TISSUE ONCE
Status: COMPLETED | OUTPATIENT
Start: 2019-05-06 | End: 2019-05-06

## 2019-05-06 RX ORDER — OXYCODONE HYDROCHLORIDE AND ACETAMINOPHEN 5; 325 MG/1; MG/1
1 TABLET ORAL EVERY 6 HOURS PRN
Qty: 28 TABLET | Refills: 0 | Status: SHIPPED | OUTPATIENT
Start: 2019-05-06 | End: 2019-05-13

## 2019-05-06 RX ORDER — DEXAMETHASONE SODIUM PHOSPHATE 4 MG/ML
INJECTION, SOLUTION INTRA-ARTICULAR; INTRALESIONAL; INTRAMUSCULAR; INTRAVENOUS; SOFT TISSUE PRN
Status: DISCONTINUED | OUTPATIENT
Start: 2019-05-06 | End: 2019-05-06 | Stop reason: SDUPTHER

## 2019-05-06 RX ADMIN — DEXAMETHASONE SODIUM PHOSPHATE 8 MG: 4 INJECTION, SOLUTION INTRA-ARTICULAR; INTRALESIONAL; INTRAMUSCULAR; INTRAVENOUS; SOFT TISSUE at 07:08

## 2019-05-06 RX ADMIN — LIDOCAINE HYDROCHLORIDE 100 MG: 20 INJECTION, SOLUTION INTRAVENOUS at 07:29

## 2019-05-06 RX ADMIN — FENTANYL CITRATE 50 MCG: 50 INJECTION, SOLUTION INTRAMUSCULAR; INTRAVENOUS at 10:10

## 2019-05-06 RX ADMIN — FENTANYL CITRATE 50 MCG: 50 INJECTION, SOLUTION INTRAMUSCULAR; INTRAVENOUS at 09:50

## 2019-05-06 RX ADMIN — FENTANYL CITRATE 50 MCG: 50 INJECTION, SOLUTION INTRAMUSCULAR; INTRAVENOUS at 09:57

## 2019-05-06 RX ADMIN — Medication 140 MG: at 07:30

## 2019-05-06 RX ADMIN — Medication 5 MG: at 09:23

## 2019-05-06 RX ADMIN — KETOROLAC TROMETHAMINE 30 MG: 30 INJECTION, SOLUTION INTRAMUSCULAR; INTRAVENOUS at 09:23

## 2019-05-06 RX ADMIN — DEXAMETHASONE SODIUM PHOSPHATE 10 MG: 4 INJECTION, SOLUTION INTRAMUSCULAR; INTRAVENOUS at 07:36

## 2019-05-06 RX ADMIN — MIDAZOLAM HYDROCHLORIDE 2 MG: 1 INJECTION, SOLUTION INTRAMUSCULAR; INTRAVENOUS at 07:26

## 2019-05-06 RX ADMIN — PROMETHAZINE HYDROCHLORIDE 12.5 MG: 25 INJECTION INTRAMUSCULAR; INTRAVENOUS at 09:45

## 2019-05-06 RX ADMIN — ROCURONIUM BROMIDE 20 MG: 10 INJECTION INTRAVENOUS at 07:40

## 2019-05-06 RX ADMIN — FENTANYL CITRATE 50 MCG: 50 INJECTION INTRAMUSCULAR; INTRAVENOUS at 09:35

## 2019-05-06 RX ADMIN — ROCURONIUM BROMIDE 5 MG: 10 INJECTION INTRAVENOUS at 08:32

## 2019-05-06 RX ADMIN — PROMETHAZINE HYDROCHLORIDE 12.5 MG: 25 INJECTION, SOLUTION INTRAMUSCULAR; INTRAVENOUS at 09:45

## 2019-05-06 RX ADMIN — OXYCODONE AND ACETAMINOPHEN 2 TABLET: 5; 325 TABLET ORAL at 11:20

## 2019-05-06 RX ADMIN — SODIUM CHLORIDE: 9 INJECTION, SOLUTION INTRAVENOUS at 07:06

## 2019-05-06 RX ADMIN — FENTANYL CITRATE 50 MCG: 50 INJECTION, SOLUTION INTRAMUSCULAR; INTRAVENOUS at 10:05

## 2019-05-06 RX ADMIN — FENTANYL CITRATE 25 MCG: 50 INJECTION INTRAMUSCULAR; INTRAVENOUS at 09:24

## 2019-05-06 RX ADMIN — Medication 0.8 MG: at 09:23

## 2019-05-06 RX ADMIN — ACETAMINOPHEN 1000 MG: 500 TABLET, FILM COATED ORAL at 07:07

## 2019-05-06 RX ADMIN — MELOXICAM 7.5 MG: 7.5 TABLET ORAL at 07:07

## 2019-05-06 RX ADMIN — PROPOFOL 170 MG: 10 INJECTION, EMULSION INTRAVENOUS at 07:30

## 2019-05-06 RX ADMIN — FENTANYL CITRATE 100 MCG: 50 INJECTION INTRAMUSCULAR; INTRAVENOUS at 07:29

## 2019-05-06 RX ADMIN — FENTANYL CITRATE 25 MCG: 50 INJECTION INTRAMUSCULAR; INTRAVENOUS at 09:28

## 2019-05-06 RX ADMIN — Medication 2 G: at 07:36

## 2019-05-06 RX ADMIN — ONDANSETRON HYDROCHLORIDE 4 MG: 4 INJECTION, SOLUTION INTRAMUSCULAR; INTRAVENOUS at 07:36

## 2019-05-06 RX ADMIN — SODIUM CHLORIDE: 9 INJECTION, SOLUTION INTRAVENOUS at 08:10

## 2019-05-06 ASSESSMENT — PULMONARY FUNCTION TESTS
PIF_VALUE: 12
PIF_VALUE: 35
PIF_VALUE: 33
PIF_VALUE: 19
PIF_VALUE: 27
PIF_VALUE: 33
PIF_VALUE: 18
PIF_VALUE: 19
PIF_VALUE: 34
PIF_VALUE: 34
PIF_VALUE: 33
PIF_VALUE: 34
PIF_VALUE: 34
PIF_VALUE: 4
PIF_VALUE: 33
PIF_VALUE: 34
PIF_VALUE: 19
PIF_VALUE: 2
PIF_VALUE: 34
PIF_VALUE: 33
PIF_VALUE: 0
PIF_VALUE: 34
PIF_VALUE: 36
PIF_VALUE: 18
PIF_VALUE: 33
PIF_VALUE: 34
PIF_VALUE: 33
PIF_VALUE: 35
PIF_VALUE: 10
PIF_VALUE: 35
PIF_VALUE: 34
PIF_VALUE: 35
PIF_VALUE: 33
PIF_VALUE: 15
PIF_VALUE: 33
PIF_VALUE: 34
PIF_VALUE: 33
PIF_VALUE: 34
PIF_VALUE: 6
PIF_VALUE: 33
PIF_VALUE: 34
PIF_VALUE: 33
PIF_VALUE: 34
PIF_VALUE: 33
PIF_VALUE: 34
PIF_VALUE: 33
PIF_VALUE: 34
PIF_VALUE: 33
PIF_VALUE: 34
PIF_VALUE: 33
PIF_VALUE: 27
PIF_VALUE: 33
PIF_VALUE: 34
PIF_VALUE: 7
PIF_VALUE: 33
PIF_VALUE: 0
PIF_VALUE: 35
PIF_VALUE: 19
PIF_VALUE: 34
PIF_VALUE: 33
PIF_VALUE: 32
PIF_VALUE: 34
PIF_VALUE: 3
PIF_VALUE: 34
PIF_VALUE: 26
PIF_VALUE: 33
PIF_VALUE: 34
PIF_VALUE: 33
PIF_VALUE: 21
PIF_VALUE: 1
PIF_VALUE: 34
PIF_VALUE: 19
PIF_VALUE: 34
PIF_VALUE: 23
PIF_VALUE: 35
PIF_VALUE: 12
PIF_VALUE: 34
PIF_VALUE: 24
PIF_VALUE: 33
PIF_VALUE: 34
PIF_VALUE: 33
PIF_VALUE: 19
PIF_VALUE: 17
PIF_VALUE: 33
PIF_VALUE: 34
PIF_VALUE: 4
PIF_VALUE: 34
PIF_VALUE: 27
PIF_VALUE: 34
PIF_VALUE: 1
PIF_VALUE: 34
PIF_VALUE: 34
PIF_VALUE: 33
PIF_VALUE: 2
PIF_VALUE: 35
PIF_VALUE: 34
PIF_VALUE: 2

## 2019-05-06 ASSESSMENT — PAIN SCALES - GENERAL
PAINLEVEL_OUTOF10: 5
PAINLEVEL_OUTOF10: 5
PAINLEVEL_OUTOF10: 7
PAINLEVEL_OUTOF10: 7
PAINLEVEL_OUTOF10: 0
PAINLEVEL_OUTOF10: 5
PAINLEVEL_OUTOF10: 0
PAINLEVEL_OUTOF10: 6
PAINLEVEL_OUTOF10: 7
PAINLEVEL_OUTOF10: 7

## 2019-05-06 NOTE — ANESTHESIA PRE PROCEDURE
Department of Anesthesiology  Preprocedure Note       Name:  Mele Parikh   Age:  28 y.o.  :  1985                                          MRN:  [id number]         Date:  2019      Surgeon: Dl Szymanski):  Víctor Belcher MD    Procedure: ROBOTIC ASSISTED RIGHT INGUINAL HERNIA REPAIR, POSS LEFT, POSS OPEN REPAIR W/ MESH (N/A Abdomen)    Medications prior to admission:   Prior to Admission medications    Medication Sig Start Date End Date Taking?  Authorizing Provider   lisinopril (PRINIVIL;ZESTRIL) 20 MG tablet Take 1 tablet by mouth daily 19   Natalie Collazo MD       Current medications:    Current Facility-Administered Medications   Medication Dose Route Frequency Provider Last Rate Last Dose    0.9 % sodium chloride infusion   Intravenous Continuous Víctor Belcher MD        sodium chloride flush 0.9 % injection 10 mL  10 mL Intravenous 2 times per day Víctor Belcher MD        sodium chloride flush 0.9 % injection 10 mL  10 mL Intravenous PRN Víctor Belcher MD        ceFAZolin (ANCEF) 2 g in dextrose 5 % 50 mL IVPB  2 g Intravenous On Call to MD Anastasia        acetaminophen (TYLENOL) tablet 1,000 mg  1,000 mg Oral Once Víctor Belcher MD        Dexamethasone Sodium Phosphate injection 8 mg  8 mg Intravenous Once Víctor Belcher MD        meloxicam 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    Right inguinal hernia K40.90       Past Medical History:        Diagnosis Date    GERD (gastroesophageal reflux disease)     Hypertension        Past Surgical History:        Procedure Laterality Date    HERNIA REPAIR Left     inguinal-[facility] Dr Rosalie Knox       Social History:    Social History     Tobacco Use    Smoking status: Never Smoker    Smokeless tobacco: Never Used   Substance Use Topics  Alcohol use: Yes     Comment: occ                                Counseling given: Not Answered      Vital Signs (Current):   Vitals:    05/06/19 0633   BP: 137/82   Pulse: 94   Resp: 16   Temp: 98.5 °F (36.9 °C)   SpO2: 96%                                              BP Readings from Last 3 Encounters:   05/06/19 137/82   04/08/19 132/80   04/01/19 138/88       NPO Status: Time of last liquid consumption: 2355                        Time of last solid consumption: 2355                        Date of last liquid consumption: 05/05/19                        Date of last solid food consumption: 05/05/19    BMI:   Wt Readings from Last 3 Encounters:   04/08/19 239 lb 3.2 oz (108.5 kg)   04/01/19 234 lb (106.1 kg)   11/25/16 250 lb (113.4 kg)     There is no height or weight on file to calculate BMI.    CBC:   Lab Results   Component Value Date    WBC 16.4 11/25/2016    RBC 4.39 11/25/2016    HGB 13.2 11/25/2016    HCT 38.6 11/25/2016    MCV 88.0 11/25/2016    RDW 12.9 11/25/2016     11/25/2016       CMP:   Lab Results   Component Value Date     05/01/2019    K 3.8 05/01/2019     05/01/2019    CO2 31 05/01/2019    BUN 13 05/01/2019    CREATININE 0.9 05/01/2019    LABGLOM >90 05/01/2019    GLUCOSE 79 05/01/2019    PROT 8.0 10/20/2013    CALCIUM 9.1 05/01/2019    BILITOT 0.6 10/20/2013    ALKPHOS 86 10/20/2013    AST 40 10/20/2013    ALT 93 10/20/2013       POC Tests: No results for input(s): POCGLU, POCNA, POCK, POCCL, POCBUN, POCHEMO, POCHCT in the last 72 hours.     Coags: No results found for: PROTIME, INR, APTT    HCG (If Applicable): No results found for: PREGTESTUR, PREGSERUM, HCG, HCGQUANT     ABGs: No results found for: PHART, PO2ART, YAZ6BMY, NMU9CUV, BEART, Z9QWHWJI     Type & Screen (If Applicable):  No results found for: LABABO, [facility]    Anesthesia Evaluation  Patient summary reviewed and Nursing notes reviewed  Airway: Mallampati: II  TM distance: >3 FB   Neck ROM: full  Mouth opening: > = 3 FB Dental:          Pulmonary: breath sounds clear to auscultation                             Cardiovascular:  Exercise tolerance: good (>4 METS),   (+) hypertension: mild,         Rhythm: regular  Rate: normal                    Neuro/Psych:               GI/Hepatic/Renal:   (+) GERD: well controlled,           Endo/Other:                     Abdominal:       Abdomen: soft. Vascular:                                        Anesthesia Plan      general     ASA 2       Induction: intravenous. MIPS: Postoperative opioids intended and Prophylactic antiemetics administered. Anesthetic plan and risks discussed with patient. Plan discussed with CRNA.                   [address] [place] [address],    5/6/2019

## 2019-05-06 NOTE — ANESTHESIA POSTPROCEDURE EVALUATION
Department of Anesthesiology  Postprocedure Note    Patient: Sophia Leyden  MRN: [id number]  YOB: 1985  Date of evaluation: 5/6/2019  Time:  10:17 AM     Procedure Summary     Date:  05/06/19 Room / Location:  [address] [place] [address] HAMIDA Barrera    Anesthesia Start:  0727 Anesthesia Stop:  1642    Procedure:  ROBOTIC ASSISTED RIGHT INGUINAL HERNIA REPAIR  W/ MESH (N/A Abdomen) Diagnosis:  (RIGHT INGUINAL HERNIA)    Surgeon:  Talha Kirkland MD Responsible Provider:  Virgil Stoner DO    Anesthesia Type:  general ASA Status:  2          Anesthesia Type: general    Silvano Phase I: Silvano Score: 8    Silvano Phase II:      Last vitals: Reviewed and per EMR flowsheets.        Anesthesia Post Evaluation    Patient location during evaluation: PACU  Patient participation: complete - patient participated  Level of consciousness: awake  Airway patency: patent  Nausea & Vomiting: no nausea and no vomiting  Complications: no  Cardiovascular status: hemodynamically stable  Respiratory status: acceptable  Hydration status: stable

## 2019-05-06 NOTE — PROGRESS NOTES
Pt has met discharge criteria and states he is ready for discharge to home. IV removed, gauze and tape applied. Dressed in own clothes and personal belongings gathered. Discharge instructions given to pt and family; pt and family verbalized understanding of discharge instructions, prescriptions and follow up appointments. Pt transported to discharge lobby by [facility] staff.

## 2019-05-06 NOTE — BRIEF OP NOTE
Brief Postoperative Note  ______________________________________________________________    Patient: Carlos Rodríguez  YOB: 1985  MRN: [id number]  Date of Procedure: 5/6/2019    Pre-Op Diagnosis: RIGHT INGUINAL HERNIA    Post-Op Diagnosis: Same       Procedure(s):  ROBOTIC ASSISTED RIGHT INGUINAL HERNIA REPAIR  W/ MESH    Anesthesia: General    Surgeon(s):  Nito Stewart MD    Assistant: none    Estimated Blood Loss (mL): less than 50     Complications: None    Specimens:   * No specimens in log *    Implants:  Implant Name Type Inv.  Item Serial No.  Lot No. LRB No. Used   MESH PROGRIP LAPSCP Mesh MESH PROGRIP LAPSCP  COVIDIEN  SURGICAL [id number] Left 1         Drains: * No LDAs found *    Findings: Large incarcerated indirect inguinal hernia    Nito Stewart MD  Date: 5/6/2019  Time: 9:28 AM

## 2019-05-06 NOTE — PROGRESS NOTES
1030  Patient meets PACU D/C criteria at this time. Patient is awake and alert on RA and VSS. Patient's nausea has improved but still having abdominal pain. Patient transported back to [facility] room 8. Family in room upon arrival.  Report given to [facility] RN SDS.

## 2019-05-06 NOTE — FLOWSHEET NOTE
Pt arrived to [facility] room 8. Vitals and assessment as charted. Pt eating and drinking. Family at the bedside. Call light in reach.

## 2019-05-07 ENCOUNTER — TELEPHONE (OUTPATIENT)
Dept: FAMILY MEDICINE CLINIC | Age: 34
End: 2019-05-07

## 2019-05-07 NOTE — TELEPHONE ENCOUNTER
Patient is asking to have his insurance card faxed over to [facility] as he has lost his card.

## 2019-05-08 NOTE — OP NOTE
[address] [place] [address]                                OPERATIVE REPORT    PATIENT NAME: Chilo Lemons                   :        1985  MED REC NO:   [id number]                           ROOM:  ACCOUNT NO:   [de-identified]                           ADMIT DATE: 2019  PROVIDER:     Joyce Milton. Hannah Galeana M.D.    Itzel Sabas:  2019    PREOPERATIVE DIAGNOSIS:  Right incarcerated indirect inguinal hernia. POSTOPERATIVE DIAGNOSIS:  Right incarcerated indirect inguinal hernia,  incarcerated with a large amount of omentum. PROCEDURE:  Robotic-assisted transabdominal preperitoneal repair with  ProGrip mesh. SURGEON:  Joyce Milton. Hannah Galeana MD    ANESTHESIA:  General.    COMPLICATIONS:  None. APPARENT FINDINGS:  Large indirect inguinal hernia sac containing  incarcerated omentum, which had to be reduced intraoperatively to get it  all out of the sac and adhesions taken down. He had had a previous left  inguinal hernia repair and at time of developing the preperitoneal  space, it appears that the right side had been dissected out in the past  in  when he had his left side done and getting into the space the  previously placed mesh came across the midline, it was actually across  the pubic tubercle onto the right side. NARRATIVE:  The patient was brought to the operating room, placed  supine, SCDs in place, signed consent on the chart. Preoperative  antibiotics were given. Time-out was taken. The abdomen was clipped,  prepped and draped sterilely with ChloraPrep and 3 minutes allowed to  pass, draped sterilely. Midline retirement between the umbilicus and the  xyphoid, vertical incision was made, a 5-mm Optiview port was used to  access the abdominal cavity with the 5 mm port. The area below the  insertion showed no evidence of injury. The abdomen was then deflated  to a pressure of 14.   At this point, the two robotic ports placed on the  right and left, even when the anterior superior iliac spine at the same  level of the camera and then the patient was placed with the head down  15 degrees. The 5 mm port was then changed to an 8 mm robotic port and  I can see that there was omentum stuck in the right-sided hernia. At  this point, the robot was docked and with monopolar scissors in the  right hand and ProGrasp in the left, the peritoneum was incised from  lateral to medial.  We started our dissection medially and as we started  to get down, the peritoneum was densely stuck to the posterior rectus,  unusual for an un-operated side. I went lateral and this phase was  easily developed off the transversalis and then came medially and  multiple rents were made in this very thin peritoneum that was stuck to  the back of the rectus muscle. As we came over to the median umbilical  ligament, we dissected down and this was also scarred in this area as  well. As we worked to stay anterior, made our way down to the inferior  pubic ramus, eventually was able to identify this medially and then  working towards the pubic tubercle came across the mesh from the left  side, which actually had crossed over the midline across the symphysis  over the pubic tubercle on the right hand side, so it appears that this  side was dissected somewhat in the past of this previous hernia repair. So, I cleared the inferior pubic ramus over to the indirect sac and then  laterally we developed a space for hernia patch placement to the level  of the anterior superior iliac spine and then centrally we have the  hernia sac. The omentum had to be fairly brought out and adhesions  removed and eventually a large bulk of the omentum which was present in  it was reduced out of the hernia sac and then we were able to fully  dissect out the hernia sac.   There was an extensive amount of scar on  the medial side of the sac, so it appears that when they did the  dissection maybe this was in the plane as well. Laterally it was clear,  so we stayed on the lateral plane for sweeping off the cord structures  and the vas deferens and continuing with serial traction on the sac,  eventually making it to the end of the sac and  off the cord  structures. This then, we worked to finish medially taking off the rest  of the adhesions and developing the space inferiorly could see the  triangular shape of the vas deferens that has came up and joined the  cord structures and then dissecting for at least 3 cm inferior to this. At this point, a ProGrip mesh 10 x 15 brought into the field, placed in  the abdominal wall preperitoneal space from the bottom up, secured along  the inferior pubic ramus and the abdominal wall with two sutures of 2-0  Vicryl and it was completely deployed flat. I then pulled up on the  bottom peritoneal flap and the patch did not move or fold up and then at  this point, I closed the transverse incision in the peritoneum with a  2-0 V-Loc and then using 2-0 Vicryl the hernia sac was used to cover up  and obliterate the openings in the peritoneum that were medially. All  needles were retrieved. Peritoneal flap  inspected was intact and then the abdomen  was desufflated. Ports were removed. Port sites were closed with 4-0  Vicryl deep. A 20 mL of Marcaine 0.5% plain injected in the incisions. Skin Affix glue was applied. The patient was brought back to recovery  room in stable condition.         Stefani Carlos M.D.    D: 05/07/2019 8:28:42       T: 05/07/2019 14:12:16     [email or web address]  Job#: [id number]     Doc#: [id number]    CC:

## 2019-05-19 PROBLEM — Z98.890 S/P RIGHT INGUINAL HERNIA REPAIR: Status: ACTIVE | Noted: 2019-05-19

## 2019-05-19 PROBLEM — Z87.19 S/P RIGHT INGUINAL HERNIA REPAIR: Status: ACTIVE | Noted: 2019-05-19

## (undated) DEVICE — SOLUTION IV 1000ML 0.9% SOD CHL PH 5 INJ USP VIAFLX PLAS

## (undated) DEVICE — WARMER SCP 2 ANTIFOG LAP DISP

## (undated) DEVICE — INTENDED FOR TISSUE SEPARATION, AND OTHER PROCEDURES THAT REQUIRE A SHARP SURGICAL BLADE TO PUNCTURE OR CUT.: Brand: BARD-PARKER ® CARBON RIB-BACK BLADES

## (undated) DEVICE — BLADE LARYNSCP SZ 3 ENH DIR INTUB GLIDESCOPE MCGRATH MAC

## (undated) DEVICE — GLOVE SURG SZ 6 THK91MIL LTX FREE SYN POLYISOPRENE ANTI

## (undated) DEVICE — LINER SUCT CANSTR 1500CC SEMI RIG W/ POR HYDROPHOBIC SHUT

## (undated) DEVICE — CANNULA SEAL

## (undated) DEVICE — GLOVE SURG SZ 65 THK91MIL LTX FREE SYN POLYISOPRENE

## (undated) DEVICE — [HIGH FLOW INSUFFLATOR,  DO NOT USE IF PACKAGE IS DAMAGED,  KEEP DRY,  KEEP AWAY FROM SUNLIGHT,  PROTECT FROM HEAT AND RADIOACTIVE SOURCES.]: Brand: PNEUMOSURE

## (undated) DEVICE — SKIN AFFIX SURG ADHESIVE 72/CS 0.55ML: Brand: MEDLINE

## (undated) DEVICE — GOWN,SIRUS,NON REINFRCD,LARGE,SET IN SL: Brand: MEDLINE

## (undated) DEVICE — YANKAUER,BULB TIP,W/O VENT,RIGID,STERILE: Brand: MEDLINE

## (undated) DEVICE — TROCAR ENDOSCP L100MM DIA5MM BLDELSS STBL SL THRD OPT VW

## (undated) DEVICE — ARM DRAPE

## (undated) DEVICE — COLUMN DRAPE

## (undated) DEVICE — TIP COVER ACCESSORY

## (undated) DEVICE — GLOVE ORANGE PI 7 1/2   MSG9075

## (undated) DEVICE — SOLUTION ANTIFOG VIS SYS CLEARIFY LAPSCP

## (undated) DEVICE — SUTURE V-LOC 180 SZ 3-0 L9IN ABSRB GRN L26MM V-20 1/2 CIR VLOCL0644

## (undated) DEVICE — GENERAL LAPAROSCOPY PACK-LF: Brand: MEDLINE INDUSTRIES, INC.

## (undated) DEVICE — BLADE CLIPPER GEN PURP NS

## (undated) DEVICE — MESH HERN W10XL15CM POLY POLYLACTIC ACID 70% CLLGN 30% GLYC: Type: IMPLANTABLE DEVICE | Site: INGUINAL | Status: NON-FUNCTIONAL

## (undated) DEVICE — ELECTRO LUBE IS A SINGLE PATIENT USE DEVICE THAT IS INTENDED TO BE USED ON ELECTROSURGICAL ELECTRODES TO REDUCE STICKING.: Brand: KEY SURGICAL ELECTRO LUBE

## (undated) DEVICE — DRESSING TRNSPAR W5XL4.5IN FLM SHT SEMIPERMEABLE WIND

## (undated) DEVICE — 35 ML SYRINGE LUER-LOCK TIP: Brand: MONOJECT

## (undated) DEVICE — TUBING, SUCTION, 1/4" X 20', STRAIGHT: Brand: MEDLINE INDUSTRIES, INC.

## (undated) DEVICE — GLOVE ORANGE PI 7   MSG9070